# Patient Record
Sex: MALE | Race: BLACK OR AFRICAN AMERICAN
[De-identification: names, ages, dates, MRNs, and addresses within clinical notes are randomized per-mention and may not be internally consistent; named-entity substitution may affect disease eponyms.]

---

## 2017-12-29 PROBLEM — Z00.00 ENCOUNTER FOR PREVENTIVE HEALTH EXAMINATION: Status: ACTIVE | Noted: 2017-12-29

## 2018-01-24 ENCOUNTER — APPOINTMENT (OUTPATIENT)
Dept: NEUROLOGY | Facility: CLINIC | Age: 48
End: 2018-01-24

## 2022-07-20 ENCOUNTER — APPOINTMENT (OUTPATIENT)
Dept: ORTHOPEDIC SURGERY | Facility: CLINIC | Age: 52
End: 2022-07-20

## 2022-07-26 ENCOUNTER — APPOINTMENT (OUTPATIENT)
Dept: ORTHOPEDIC SURGERY | Facility: CLINIC | Age: 52
End: 2022-07-26

## 2022-07-26 VITALS — BODY MASS INDEX: 32.33 KG/M2 | HEIGHT: 67 IN | WEIGHT: 206 LBS

## 2022-07-26 DIAGNOSIS — M79.18 MYALGIA, OTHER SITE: ICD-10-CM

## 2022-07-26 DIAGNOSIS — Z78.9 OTHER SPECIFIED HEALTH STATUS: ICD-10-CM

## 2022-07-26 PROCEDURE — 99204 OFFICE O/P NEW MOD 45 MIN: CPT

## 2022-07-26 PROCEDURE — 73030 X-RAY EXAM OF SHOULDER: CPT | Mod: RT

## 2022-07-26 PROCEDURE — 73564 X-RAY EXAM KNEE 4 OR MORE: CPT | Mod: RT

## 2022-07-26 PROCEDURE — 73010 X-RAY EXAM OF SHOULDER BLADE: CPT | Mod: RT

## 2022-07-26 RX ORDER — NAPROXEN 500 MG/1
500 TABLET ORAL TWICE DAILY
Qty: 60 | Refills: 0 | Status: ACTIVE | COMMUNITY
Start: 2022-07-26 | End: 1900-01-01

## 2022-07-26 NOTE — HISTORY OF PRESENT ILLNESS
[de-identified] : 52 year old male  (RHD , self-employed )  right knee and right shoulder pain. Injured his knee falling on 7/19. Sudden onset of shoulder pain since 6/28. \par The pain is located over medial aspect of the knee. Anterior shoulder. \par The pain is associated with (n/a for both knee and shoulder)\par Worse with activity, lateral movements w/ knee. OH movements and sleeping on involved side w/ shoulder. better at rest.\par Has tried modified activity\par H/O: n/a\par

## 2022-07-26 NOTE — ASSESSMENT
[FreeTextEntry1] : Right X-Ray Examination of the KNEE (4 views): medial and patellofemoral degenerate changes.\par Right X-Ray Examination of the SHOULDER (2 views):  no fractures, subluxations or dislocations. AC Joint Arthrosis\par X-Ray Examination of the SCAPULA 1 or 2 views shows: no significant abnormalities there is Type II acromion.. There is an acromial spur\par \par \par - We discussed their diagnosis and treatment options at length including surgical and non-surgical options.\par - We will continue conservative treatment with activity modification, PT, icing, weight loss, and anti-inflammatory medications.\par - The patient was provided with a PT prescription to work on ROM, hip ER/abductors strengthening, quad/hamstring stretches and strengthening, and other exercises.\par - The patient was advised to let pain guide the gradual advancement of activities.\par - We discussed the possible of injections in the future.\par - napryn rx\par - Patient was given a prescription for an anti-inflammatory medication.  They will take it for the next week and then on an as needed basis, as long as there are no medical contra-indications.  Patient is counseled on possible GI, renal, and cardiovascular side effects.\par - Follow up as needed in 6 weeks as to re-evaluate.\par

## 2022-07-26 NOTE — IMAGING
[de-identified] : \par RIGHT SHOULDER\par Inspection: No swelling. \par Palpation: Tenderness is noted at the bicipital groove, anterior and lateral. \par Range of motion: There is pain with range of motion.\par , ER 55, @90ER 90, @90IR 30\par Strength: There is pain and discomfort with strength testing.\par Forward Flexion 4/5. Abduction 4/5.  External Rotation 5-/5 and Internal Rotation 5/5 \par Neurological testings: motor and sensor intact distally.\par Ligament Stability and Special Tests: \par There is positive arc of pain. \par Shoulder apprehension: neg\par Shoulder relocation: neg\par Díaz’s test: pos\par Biceps Active test: neg\par Hill Labral Shear: neg\par Impingement testing: pos\par Iona testing: pos\par Whipple: pos\par Cross Body Adduction: neg\par \par \par RIGHT KNEE\par Inspection:  mild effusion\par Palpation: medial joint line tenderness, anterior tenderness\par Knee Range of Motion:  3-125 \par Strength: 5/5 Quadriceps strength, 5/5 Hamstring strength\par Neurological: light touch is intact throughout\par Ligament Stability and Special Tests: \par McMurrays: neg\par Lachman: neg\par Pivot Shift: neg\par Posterior Drawer: neg\par Valgus: neg\par Varus: neg\par Patella Apprehension: neg\par Patella Maltracking: neg\par \par

## 2022-10-31 ENCOUNTER — APPOINTMENT (OUTPATIENT)
Dept: ORTHOPEDIC SURGERY | Facility: CLINIC | Age: 52
End: 2022-10-31

## 2022-10-31 VITALS — BODY MASS INDEX: 32.33 KG/M2 | WEIGHT: 206 LBS | HEIGHT: 67 IN

## 2022-10-31 DIAGNOSIS — M17.11 UNILATERAL PRIMARY OSTEOARTHRITIS, RIGHT KNEE: ICD-10-CM

## 2022-10-31 PROCEDURE — 99214 OFFICE O/P EST MOD 30 MIN: CPT

## 2022-10-31 NOTE — DISCUSSION/SUMMARY
[de-identified] : Patient allowed to gently start resuming activities. \par Discussed change to medication prescription and usage. \par Offered cortisone steroid injection. \par Bracing options discussed with the pt\par \par RE:  SHANNAN BUNN \par \par Acct #- [id number] \par \par Attention:  Nurse Reviewer /Medical Director\par \par  \par Based on my patient's condition, I strongly believe that the MRI R knee, R shoulder is medically.necessary.  \par The patient has failed oral meds, and conservative treatment in combination or by themselves and therefore needs the MRI.  \par The MRI will dictate further treatment t recommendations.\par \par

## 2022-10-31 NOTE — HISTORY OF PRESENT ILLNESS
[Gradual] : gradual [6] : 6 [1] : 2 [Dull/Aching] : dull/aching [Localized] : localized [Intermittent] : intermittent [de-identified] : recently recurred no injury, uses naproxen on occasion, not activity related [] : Post Surgical Visit: no [FreeTextEntry1] : right knee & right shoulder [FreeTextEntry3] : july 2022 [FreeTextEntry5] : Patient fell down the stairs in july 2022 [FreeTextEntry7] : right knee (thigh)

## 2022-10-31 NOTE — PHYSICAL EXAM
[Right] : right knee [] : medial joint line tenderness [5___] : quadriceps 5[unfilled]/5 [TWNoteComboBox7] : flexion 130 degrees

## 2022-11-03 ENCOUNTER — FORM ENCOUNTER (OUTPATIENT)
Age: 52
End: 2022-11-03

## 2022-11-04 ENCOUNTER — APPOINTMENT (OUTPATIENT)
Dept: MRI IMAGING | Facility: CLINIC | Age: 52
End: 2022-11-04

## 2022-11-04 PROCEDURE — 73221 MRI JOINT UPR EXTREM W/O DYE: CPT | Mod: RT

## 2022-11-04 PROCEDURE — 73721 MRI JNT OF LWR EXTRE W/O DYE: CPT | Mod: RT

## 2022-11-09 ENCOUNTER — APPOINTMENT (OUTPATIENT)
Dept: ORTHOPEDIC SURGERY | Facility: CLINIC | Age: 52
End: 2022-11-09

## 2022-11-09 VITALS — WEIGHT: 200 LBS | BODY MASS INDEX: 31.39 KG/M2 | HEIGHT: 67 IN

## 2022-11-09 DIAGNOSIS — S46.011D STRAIN OF MUSCLE(S) AND TENDON(S) OF THE ROTATOR CUFF OF RIGHT SHOULDER, SUBSEQUENT ENCOUNTER: ICD-10-CM

## 2022-11-09 DIAGNOSIS — M75.41 IMPINGEMENT SYNDROME OF RIGHT SHOULDER: ICD-10-CM

## 2022-11-09 DIAGNOSIS — M70.51 OTHER BURSITIS OF KNEE, RIGHT KNEE: ICD-10-CM

## 2022-11-09 DIAGNOSIS — S43.431D SUPERIOR GLENOID LABRUM LESION OF RIGHT SHOULDER, SUBSEQUENT ENCOUNTER: ICD-10-CM

## 2022-11-09 PROCEDURE — 99214 OFFICE O/P EST MOD 30 MIN: CPT

## 2022-11-09 NOTE — PHYSICAL EXAM
[Right] : right knee [] : anterior tenderness [5___] : quadriceps 5[unfilled]/5 [TWNoteComboBox7] : flexion 130 degrees

## 2022-11-09 NOTE — DATA REVIEWED
[Shoulder] : shoulder [MRI] : MRI [Right] : of the right [Knee] : knee [Report was reviewed and noted in the chart] : The report was reviewed and noted in the chart [I reviewed the films/CD and agree] : I reviewed the films/CD and agree [FreeTextEntry1] : torn RTC and labrum and iS [FreeTextEntry3] : mod sprain PCL, also sprain mcl/acl, bursitis, effusion

## 2022-11-09 NOTE — HISTORY OF PRESENT ILLNESS
[de-identified] : rhas some issues and o new injury, had MRIs on no meds [Gradual] : gradual [6] : 6 [1] : 2 [Dull/Aching] : dull/aching [Localized] : localized [Intermittent] : intermittent [] : Post Surgical Visit: no [FreeTextEntry1] : right knee & right shoulder [FreeTextEntry3] : july 2022 [FreeTextEntry5] : Patient fell down the stairs in july 2022 [FreeTextEntry7] : right knee (thigh) [de-identified] : voltaren gel used

## 2022-11-09 NOTE — DISCUSSION/SUMMARY
[de-identified] : Patient allowed to gently start resuming activities. \par Discussed change to medication prescription and usage. \par Offered cortisone steroid injection. \par Bracing options discussed with the pt\par \par 11/09/2022 \par \par  RE:  SHANNAN BUNN \par \par Acct #- [id number] \par \par \par Attention:  Nurse Reviewer /Medical Director\par \par I am writing this letter as a medical necessity for PT program.\par Patient has tried analgesics, non-steroid anti-inflammatory agents, \par hot or cold compresses,injections of corticosteroids, etc)  which in combination or by themselves has not worked.\par Based on my patient's condition, I strongly believe that the PT is medically needed.\par  \par Thank you for your time and consideration.   \par poss surgery shoulder in future\par \par

## 2023-12-04 ENCOUNTER — APPOINTMENT (OUTPATIENT)
Dept: CARDIOLOGY | Facility: CLINIC | Age: 53
End: 2023-12-04